# Patient Record
Sex: MALE | Race: WHITE | Employment: OTHER | URBAN - METROPOLITAN AREA
[De-identification: names, ages, dates, MRNs, and addresses within clinical notes are randomized per-mention and may not be internally consistent; named-entity substitution may affect disease eponyms.]

---

## 2019-06-04 ENCOUNTER — TRANSFERRED RECORDS (OUTPATIENT)
Dept: HEALTH INFORMATION MANAGEMENT | Facility: CLINIC | Age: 79
End: 2019-06-04

## 2019-10-16 DIAGNOSIS — G80.0 SPASTIC QUADRIPLEGIC CEREBRAL PALSY (H): ICD-10-CM

## 2019-10-16 DIAGNOSIS — N31.9 NEUROGENIC BLADDER: Primary | ICD-10-CM

## 2019-10-16 RX ORDER — AMPICILLIN 2 G/1
2 INJECTION, POWDER, FOR SOLUTION INTRAVENOUS
Status: DISCONTINUED | OUTPATIENT
Start: 2019-10-16 | End: 2019-10-16 | Stop reason: HOSPADM

## 2019-10-16 NOTE — PROGRESS NOTES
79 yo M with CP, GMFCS 5 with neurogenic bladder managed with an indwelling ceballos catheter. He takes terazosin 2mg at night for BPH. He is verbal and has a manual wheelchair. He transfers with [facility] and [facility]. His ceballos catheter, that he has had for 4 years is changed monthly. He has had some ventral tearing and bleeding with having a prolonged catheter. He has no issues with leakage, urolithiasis or UTIs.    Desires SP tube.

## 2019-10-17 ENCOUNTER — TELEPHONE (OUTPATIENT)
Dept: UROLOGY | Facility: CLINIC | Age: 79
End: 2019-10-17

## 2019-10-17 PROBLEM — N31.9 NEUROGENIC BLADDER: Status: ACTIVE | Noted: 2019-10-17

## 2019-10-17 PROBLEM — G80.0 SPASTIC QUADRIPLEGIC CEREBRAL PALSY (H): Status: ACTIVE | Noted: 2019-10-17

## 2019-10-17 NOTE — TELEPHONE ENCOUNTER
Patient is scheduled for surgery with Dr. Lal      Spoke or left message with: Carola (medical coordinator)    Date of Surgery: 11/21/19    Location: [place] OR    Informed patient they will need an adult  yes    Pre-op with surgeon (if applicable): n/a    H&P: Scheduled with pcp    Additional imaging/appointments: n/a    Surgery packet: mailed 10/18/19     Additional comments: n/a

## 2019-11-04 ENCOUNTER — TRANSFERRED RECORDS (OUTPATIENT)
Dept: HEALTH INFORMATION MANAGEMENT | Facility: CLINIC | Age: 79
End: 2019-11-04

## 2019-11-08 DIAGNOSIS — N39.0 URINARY TRACT INFECTION: Primary | ICD-10-CM

## 2019-11-08 RX ORDER — NITROFURANTOIN 25; 75 MG/1; MG/1
100 CAPSULE ORAL 2 TIMES DAILY
Qty: 10 CAPSULE | Refills: 0 | Status: SHIPPED | OUTPATIENT
Start: 2019-11-08 | End: 2019-11-20

## 2019-11-19 RX ORDER — LEVOTHYROXINE SODIUM 50 UG/1
50 TABLET ORAL DAILY
COMMUNITY

## 2019-11-19 RX ORDER — PAROXETINE 10 MG/1
20 TABLET, FILM COATED ORAL EVERY EVENING
COMMUNITY

## 2019-11-19 RX ORDER — TERAZOSIN 2 MG/1
2 CAPSULE ORAL AT BEDTIME
COMMUNITY
End: 2019-11-20

## 2019-11-19 RX ORDER — NYSTATIN 10B UNIT
POWDER (EA) MISCELLANEOUS 3 TIMES DAILY PRN
COMMUNITY

## 2019-11-19 RX ORDER — TROLAMINE SALICYLATE 10 G/100G
CREAM TOPICAL 4 TIMES DAILY PRN
COMMUNITY

## 2019-11-19 RX ORDER — FLUTICASONE PROPIONATE 50 MCG
2 SPRAY, SUSPENSION (ML) NASAL
COMMUNITY

## 2019-11-19 RX ORDER — BACLOFEN 20 MG/1
20 TABLET ORAL 4 TIMES DAILY
COMMUNITY

## 2019-11-19 RX ORDER — GINSENG 100 MG
CAPSULE ORAL DAILY PRN
COMMUNITY

## 2019-11-20 ENCOUNTER — DOCUMENTATION ONLY (OUTPATIENT)
Dept: OTHER | Facility: CLINIC | Age: 79
End: 2019-11-20

## 2019-11-20 ENCOUNTER — ANESTHESIA EVENT (OUTPATIENT)
Dept: SURGERY | Facility: CLINIC | Age: 79
End: 2019-11-20
Payer: MEDICARE

## 2019-11-20 SDOH — HEALTH STABILITY: MENTAL HEALTH: HOW OFTEN DO YOU HAVE A DRINK CONTAINING ALCOHOL?: NEVER

## 2019-11-20 NOTE — OR NURSING
Contacted the Anesthesia Control Room on the [place] and spoke with Fatmata Vargas CRNA re: the patient's odontoid process fracture noted in H and P. Fatmata said she would look into this and thanked me for the information.

## 2019-11-21 ENCOUNTER — ANESTHESIA (OUTPATIENT)
Dept: SURGERY | Facility: CLINIC | Age: 79
End: 2019-11-21
Payer: MEDICARE

## 2019-11-21 ENCOUNTER — HOSPITAL ENCOUNTER (OUTPATIENT)
Facility: CLINIC | Age: 79
Discharge: NURSING, REHAB OR LONG-TERM CARE | End: 2019-11-21
Attending: UROLOGY | Admitting: UROLOGY
Payer: MEDICARE

## 2019-11-21 VITALS
SYSTOLIC BLOOD PRESSURE: 142 MMHG | WEIGHT: 155 LBS | HEIGHT: 74 IN | OXYGEN SATURATION: 98 % | BODY MASS INDEX: 19.89 KG/M2 | RESPIRATION RATE: 16 BRPM | TEMPERATURE: 97.5 F | DIASTOLIC BLOOD PRESSURE: 61 MMHG | HEART RATE: 80 BPM

## 2019-11-21 DIAGNOSIS — N31.9 NEUROGENIC BLADDER: Primary | ICD-10-CM

## 2019-11-21 DIAGNOSIS — G80.0 SPASTIC QUADRIPLEGIC CEREBRAL PALSY (H): ICD-10-CM

## 2019-11-21 DIAGNOSIS — N31.9 NEUROGENIC BLADDER: ICD-10-CM

## 2019-11-21 LAB — GLUCOSE BLDC GLUCOMTR-MCNC: 68 MG/DL (ref 70–99)

## 2019-11-21 PROCEDURE — 27210794 ZZH OR GENERAL SUPPLY STERILE: Performed by: UROLOGY

## 2019-11-21 PROCEDURE — C2627 CATH, SUPRAPUBIC/CYSTOSCOPIC: HCPCS | Performed by: UROLOGY

## 2019-11-21 PROCEDURE — 82962 GLUCOSE BLOOD TEST: CPT

## 2019-11-21 PROCEDURE — 25000128 H RX IP 250 OP 636: Performed by: NURSE ANESTHETIST, CERTIFIED REGISTERED

## 2019-11-21 PROCEDURE — 87186 SC STD MICRODIL/AGAR DIL: CPT | Performed by: UROLOGY

## 2019-11-21 PROCEDURE — 40000170 ZZH STATISTIC PRE-PROCEDURE ASSESSMENT II: Performed by: UROLOGY

## 2019-11-21 PROCEDURE — 25800025 ZZH RX 258: Performed by: UROLOGY

## 2019-11-21 PROCEDURE — 87086 URINE CULTURE/COLONY COUNT: CPT | Performed by: UROLOGY

## 2019-11-21 PROCEDURE — 87088 URINE BACTERIA CULTURE: CPT | Performed by: UROLOGY

## 2019-11-21 PROCEDURE — 25000125 ZZHC RX 250: Performed by: NURSE ANESTHETIST, CERTIFIED REGISTERED

## 2019-11-21 PROCEDURE — 25800030 ZZH RX IP 258 OP 636: Performed by: NURSE ANESTHETIST, CERTIFIED REGISTERED

## 2019-11-21 PROCEDURE — 36000055 ZZH SURGERY LEVEL 2 W FLUORO 1ST 30 MIN - UMMC: Performed by: UROLOGY

## 2019-11-21 PROCEDURE — 36000053 ZZH SURGERY LEVEL 2 EA 15 ADDTL MIN - UMMC: Performed by: UROLOGY

## 2019-11-21 PROCEDURE — 25800030 ZZH RX IP 258 OP 636: Performed by: UROLOGY

## 2019-11-21 PROCEDURE — 25000128 H RX IP 250 OP 636: Performed by: UROLOGY

## 2019-11-21 PROCEDURE — 71000027 ZZH RECOVERY PHASE 2 EACH 15 MINS: Performed by: UROLOGY

## 2019-11-21 PROCEDURE — 37000009 ZZH ANESTHESIA TECHNICAL FEE, EACH ADDTL 15 MIN: Performed by: UROLOGY

## 2019-11-21 PROCEDURE — 37000008 ZZH ANESTHESIA TECHNICAL FEE, 1ST 30 MIN: Performed by: UROLOGY

## 2019-11-21 RX ORDER — MEPERIDINE HYDROCHLORIDE 25 MG/ML
12.5 INJECTION INTRAMUSCULAR; INTRAVENOUS; SUBCUTANEOUS
Status: DISCONTINUED | OUTPATIENT
Start: 2019-11-21 | End: 2019-11-21 | Stop reason: HOSPADM

## 2019-11-21 RX ORDER — NALOXONE HYDROCHLORIDE 0.4 MG/ML
.1-.4 INJECTION, SOLUTION INTRAMUSCULAR; INTRAVENOUS; SUBCUTANEOUS
Status: DISCONTINUED | OUTPATIENT
Start: 2019-11-21 | End: 2019-11-21 | Stop reason: HOSPADM

## 2019-11-21 RX ORDER — LIDOCAINE HYDROCHLORIDE 20 MG/ML
INJECTION, SOLUTION INFILTRATION; PERINEURAL PRN
Status: DISCONTINUED | OUTPATIENT
Start: 2019-11-21 | End: 2019-11-21

## 2019-11-21 RX ORDER — ONDANSETRON 2 MG/ML
INJECTION INTRAMUSCULAR; INTRAVENOUS PRN
Status: DISCONTINUED | OUTPATIENT
Start: 2019-11-21 | End: 2019-11-21

## 2019-11-21 RX ORDER — ONDANSETRON 2 MG/ML
4 INJECTION INTRAMUSCULAR; INTRAVENOUS EVERY 30 MIN PRN
Status: DISCONTINUED | OUTPATIENT
Start: 2019-11-21 | End: 2019-11-21 | Stop reason: HOSPADM

## 2019-11-21 RX ORDER — OXYCODONE HYDROCHLORIDE 5 MG/1
5 TABLET ORAL EVERY 6 HOURS PRN
Qty: 6 TABLET | Refills: 0 | Status: SHIPPED | OUTPATIENT
Start: 2019-11-21 | End: 2019-11-24

## 2019-11-21 RX ORDER — FENTANYL CITRATE 50 UG/ML
25-50 INJECTION, SOLUTION INTRAMUSCULAR; INTRAVENOUS
Status: DISCONTINUED | OUTPATIENT
Start: 2019-11-21 | End: 2019-11-21 | Stop reason: HOSPADM

## 2019-11-21 RX ORDER — SODIUM CHLORIDE, SODIUM LACTATE, POTASSIUM CHLORIDE, CALCIUM CHLORIDE 600; 310; 30; 20 MG/100ML; MG/100ML; MG/100ML; MG/100ML
INJECTION, SOLUTION INTRAVENOUS CONTINUOUS PRN
Status: DISCONTINUED | OUTPATIENT
Start: 2019-11-21 | End: 2019-11-21

## 2019-11-21 RX ORDER — POLYETHYLENE GLYCOL 3350 17 G/17G
1 POWDER, FOR SOLUTION ORAL DAILY
Qty: 1 BOTTLE | Refills: 0 | Status: SHIPPED | OUTPATIENT
Start: 2019-11-21

## 2019-11-21 RX ORDER — AMPICILLIN 2 G/1
2 INJECTION, POWDER, FOR SOLUTION INTRAVENOUS
Status: COMPLETED | OUTPATIENT
Start: 2019-11-21 | End: 2019-11-21

## 2019-11-21 RX ORDER — ONDANSETRON 4 MG/1
4 TABLET, ORALLY DISINTEGRATING ORAL EVERY 30 MIN PRN
Status: DISCONTINUED | OUTPATIENT
Start: 2019-11-21 | End: 2019-11-21 | Stop reason: HOSPADM

## 2019-11-21 RX ORDER — PROPOFOL 10 MG/ML
INJECTION, EMULSION INTRAVENOUS CONTINUOUS PRN
Status: DISCONTINUED | OUTPATIENT
Start: 2019-11-21 | End: 2019-11-21

## 2019-11-21 RX ORDER — SODIUM CHLORIDE, SODIUM LACTATE, POTASSIUM CHLORIDE, CALCIUM CHLORIDE 600; 310; 30; 20 MG/100ML; MG/100ML; MG/100ML; MG/100ML
INJECTION, SOLUTION INTRAVENOUS CONTINUOUS
Status: DISCONTINUED | OUTPATIENT
Start: 2019-11-21 | End: 2019-11-21 | Stop reason: HOSPADM

## 2019-11-21 RX ADMIN — AMPICILLIN SODIUM 2 G: 2 INJECTION, POWDER, FOR SOLUTION INTRAMUSCULAR; INTRAVENOUS at 12:20

## 2019-11-21 RX ADMIN — GENTAMICIN SULFATE 240 MG: 40 INJECTION, SOLUTION INTRAMUSCULAR; INTRAVENOUS at 12:20

## 2019-11-21 RX ADMIN — ONDANSETRON 4 MG: 2 INJECTION INTRAMUSCULAR; INTRAVENOUS at 12:42

## 2019-11-21 RX ADMIN — SODIUM CHLORIDE, POTASSIUM CHLORIDE, SODIUM LACTATE AND CALCIUM CHLORIDE: 600; 310; 30; 20 INJECTION, SOLUTION INTRAVENOUS at 11:50

## 2019-11-21 RX ADMIN — PROPOFOL 15 MCG/KG/MIN: 10 INJECTION, EMULSION INTRAVENOUS at 12:09

## 2019-11-21 RX ADMIN — LIDOCAINE HYDROCHLORIDE 30 MG: 20 INJECTION, SOLUTION INFILTRATION; PERINEURAL at 12:09

## 2019-11-21 ASSESSMENT — MIFFLIN-ST. JEOR: SCORE: 1492.83

## 2019-11-21 NOTE — ANESTHESIA PREPROCEDURE EVALUATION
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 Pt has had prior anesthetic.     No history of anesthetic complications          ROS/MED HX    ENT/Pulmonary:  - neg pulmonary ROS     Neurologic:     (+)other neuro cerebral palsy, remote odontoid fracture    Cardiovascular:  - neg cardiovascular ROS       METS/Exercise Tolerance: Comment: Wheel chair bound    Hematologic:     (+) History of blood clots (hx LE DVT) pt is not anticoagulated, -      Musculoskeletal:   (+) arthritis (R hip, L shoulder),  -       GI/Hepatic:     (+) GERD Asymptomatic on medication,       Renal/Genitourinary:     (+) BPH, Other Renal/ Genitourinary (neurogenic bladder),       Endo:     (+) thyroid problem hypothyroidism, .      Psychiatric:         Infectious Disease:   (+) MRSA,       Malignancy:         Other:    (+) other significant disability Wheelchair bound and Developmental delay                       PHYSICAL EXAM:   Mental Status/Neuro: Abnormal Mental Status  Mental Status: latency in response but conversant, understands basic communication.   Airway: Facies: Challenging  Mallampati: Not Assessed  Mouth/Opening: Full  TM distance: > 6 cm  Neck ROM: Limited   Respiratory: Auscultation: CTAB     Resp. Rate: Normal     Resp. Effort: Normal      CV: Rhythm: Regular  Rate: Age appropriate  Heart: Normal Sounds  Edema: None   Comments:               "        LABS:  CBC: No results found for: WBC, HGB, HCT, PLT  BMP: No results found for: NA, POTASSIUM, CHLORIDE, CO2, BUN, CR, GLC  COAGS: No results found for: PTT, INR, FIBR  POC:   Lab Results   Component Value Date    BGM 68 (L) 11/21/2019     OTHER: No results found for: PH, LACT, A1C, ARCHANA, PHOS, MAG, ALBUMIN, PROTTOTAL, ALT, AST, GGT, ALKPHOS, BILITOTAL, BILIDIRECT, LIPASE, AMYLASE, KAT, TSH, T4, T3, CRP, SED     Preop Vitals    BP Readings from Last 3 Encounters:   11/21/19 (!) 141/77    Pulse Readings from Last 3 Encounters:   11/21/19 75      Resp Readings from Last 3 Encounters:   11/21/19 16    SpO2 Readings from Last 3 Encounters:   11/21/19 99%      Temp Readings from Last 1 Encounters:   11/21/19 36.7  C (98  F) (Oral)    Ht Readings from Last 1 Encounters:   11/21/19 1.88 m (6' 2\")      Wt Readings from Last 1 Encounters:   11/21/19 70.3 kg (155 lb)    Estimated body mass index is 19.9 kg/m  as calculated from the following:    Height as of this encounter: 1.88 m (6' 2\").    Weight as of this encounter: 70.3 kg (155 lb).     LDA:        Assessment:   ASA SCORE: 3    H&P: History and physical reviewed and following examination; no interval change.   Smoking Status:  Non-Smoker/Unknown   NPO Status: NPO Appropriate     Plan:   Anes. Type:  MAC   Pre-Medication: None   Induction:  IV (Standard)   Airway: Native Airway   Access/Monitoring: PIV   Maintenance: Propofol Sedation     Postop Plan:   Postop Pain: None  Postop Sedation/Airway: Not planned  Disposition: Outpatient     PONV Management:   Adult Risk Factors:, Non-Smoker   Prevention:, Propofol     CONSENT: Direct conversation; Telephone   Plan and risks discussed with: Legal Guardian   Blood Products: Consented (ALL Blood Products)       Comments for Plan/Consent:  Anesthetic risks and benefits discussed with legal guardian Roman Hope over telephone, as well as patient's caregiver Carola. Will plan for native airway with care to avoid C-spine " manipulation. They understand the possibility of conversion to GA with emergency airway manipulation, and we will plan to have fiberoptic bronchoscope on standby to minimize cervical spine manipulation in the event of requiring intubation. All questions were addressed.    Erlin Calderon, DO Erlin Calderon, DO

## 2019-11-21 NOTE — ANESTHESIA CARE TRANSFER NOTE
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    Anesthesia Type:   MAC     Note:  Airway :Room Air  Patient transferred to:Phase II  Comments: To phase 2 with CRNA, RN. Pt alert, appropriate, VSS on RA. Report and care to phase 2 RNHandoff Report: Identifed the Patient, Identified the Reponsible Provider, Reviewed the pertinent medical history, Discussed the surgical course, Reviewed Intra-OP anesthesia mangement and issues during anesthesia, Set expectations for post-procedure period and Allowed opportunity for questions and acknowledgement of understanding      Vitals: (Last set prior to Anesthesia Care Transfer)    CRNA VITALS  11/21/2019 1242 - 11/21/2019 1320      11/21/2019             Resp Rate (set):  10                Electronically Signed By: KAE Lopez CRNA  November 21, 2019  1:20 PM

## 2019-11-21 NOTE — OR NURSING
Pt may shower tomorrow per Dr. Cash.  This information was relayed to pt and caregiver.  They states understanding.

## 2019-11-21 NOTE — OR NURSING
Pt report handed off to Dr Blackwood.  He is aware of Blood glucose 68.  No need for pre procedure labs or EKG per Dr Calderon.

## 2019-11-21 NOTE — ANESTHESIA POSTPROCEDURE EVALUATION
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       Anesthesia Type:  Not documented  MAC    Reportable Event: NO     PAIN: Uncomplicated   Sign Out status: Comfortable, Well controlled pain     PONV: No PONV   Sign Out status:  No Nausea or Vomiting     Neuro/Psych: Uneventful perioperative course   Sign Out Status: Preoperative baseline; Age appropriate mentation     Airway/Resp.: Uneventful perioperative course   Sign Out Status: Non labored breathing, age appropriate RR; Resp. Status within EXPECTED Parameters     CV: Uneventful perioperative course   Sign Out status: Appropriate BP and perfusion indices; Appropriate HR/Rhythm     Disposition:   Sign Out in:  Phase II  Disposition:  Home  Recovery Course: Uneventful  Follow-Up: Not required           Last Anesthesia Record Vitals:  CRNA VITALS  2019 1242 - 2019 1342      2019             Resp Rate (set):  10          Last PACU Vitals:  Vitals Value Taken Time   BP     Temp     Pulse     Resp     SpO2     Temp src     NIBP 133/67 2019  1:07 PM   Pulse 84 2019  1:10 PM   SpO2 96 % 2019  1:10 PM   Resp     Temp     Ht Rate     Temp 2           Electronically Signed By: Erlin Calderon DO, 2019, 2:28 PM

## 2019-11-21 NOTE — OR NURSING
Dr Calderon aware of cervical fracture (C2 fx floating).  Primary caregiver Carola here with pt.  Pt stand by hands on with transfer belt up to bed.  Stands and pivots only with assist and transfer belt.  Wheelchair bound. Sacrum site reddened non blanchable,healing per Carola. Mepilex to sacrum.  Mechanical soft diet. Pt has slurred speech. Can be difficult to understand will repeat when asked to do so. Carola, primary care giver and pt planning on discharge today.

## 2019-11-21 NOTE — OP NOTE
PREOPERATIVE DIAGNOSIS:   1. Cerebral palsy  2. Neurogenic bladder    POSTOPERATIVE DIAGNOSIS:   1. Cerebral palsy  2. Neurogenic bladder    PROCEDURE PERFORMED:   1. Cystoscopy  2. Intraoperative ultrasound with interpretation of images  3. Suprapubic cystostomy with suprapubic tube placement under ultrasound guidance.     SURGEON: Jairo Lal MD  FELLOW: Keisha Strange MD  RESIDENT: Joey Brambila MD     ESTIMATED BLOOD LOSS: Minimal, less than 5 mL.     TUBES AND DRAINS: A 16-Mongolian Ceballos catheter with 10 mL and balloon.     COMPLICATIONS: None.     BRIEF HISTORY OF PRESENT ILLNESS: Mr Riley is a 78 year old male with history of cerebral palsy and neurogenic bladder that is currently managed with a urethral ceballos catheter. He is having urethral erosion and his family and he elected to have an SP tube place. We discussed options, and recommendations for suprapubic tube for bladder drainage. Patient understood risks, benefits and alternatives and agreed to proceed.    DESCRIPTION OF PROCEDURE: After obtaining informed consent, correctly identifying and marking the patient and confirming, preoperative antibiotics were given. He was brought back to the operating room table, placed supine where MAC anesthesia was induced. He was then prepped and draped in the standard sterile fashion.     We began the case by passing a 16 Fr flexible cystoscope through a well lubricated urethra. The urethra was normal and the bladder was slightly erythematous but no stones or tumors seen. Next, using ultrasound guidance we localized the bladder and confirmed that there was no bowel between the bladder and skin. We then made a 1 centimeter incision with #11 blade scalpel approximately 2-3 fingerbreadths above the pubic bone.  We then used a spinal needle to find a good trajectory under ultrasound guidance into the bladder. Once we isolated this trajectory, we then inserted the Jagdish trocar in through the incision until urine  returned. We then removed the inner cannula of the trocar and placed the 16-Azeri Frey catheter through the trocar and inflated the catheter with 10 mL in the balloon. Dressing was placed around the suprapubic catheter and a drainage bag was attached. The catheter was secured to his abdomen. The patient was awakened from anesthesia in stable condition.      As attending surgeon, Jairo SANFORD MD, was present for the critical portions of the procedure and immediately available for the entire procedure.

## 2019-11-23 LAB
BACTERIA SPEC CULT: ABNORMAL
Lab: ABNORMAL
SPECIMEN SOURCE: ABNORMAL

## 2019-11-25 ENCOUNTER — TELEPHONE (OUTPATIENT)
Dept: UROLOGY | Facility: CLINIC | Age: 79
End: 2019-11-25

## 2019-11-25 NOTE — TELEPHONE ENCOUNTER
[facility] Call Center    Phone Message    May a detailed message be left on voicemail: no    Reason for Call: Order(s): Home Care Orders: Other: Molly with the Group Home needs Split Gauze 4X4 or 2X2 immediately.  They are completely out.    Comments:  Call Molly @ [phone]  Pharmacy details:      [facility] is [address] [place], [place] and the contact number is [phone] and fax number is [phone].          Action Taken: Message routed to:  [facility] ([facility] Urology

## 2019-11-25 NOTE — TELEPHONE ENCOUNTER
Called the home back and told them we would be able to right orders and have MD sign the orders.on Wednesday . Kathryn Caballero LPN Staff Nurse

## 2019-12-31 ENCOUNTER — OFFICE VISIT (OUTPATIENT)
Dept: UROLOGY | Facility: CLINIC | Age: 79
End: 2019-12-31
Payer: MEDICARE

## 2019-12-31 VITALS
HEIGHT: 72 IN | HEART RATE: 59 BPM | SYSTOLIC BLOOD PRESSURE: 118 MMHG | DIASTOLIC BLOOD PRESSURE: 78 MMHG | WEIGHT: 160 LBS | BODY MASS INDEX: 21.67 KG/M2

## 2019-12-31 DIAGNOSIS — N31.9 NEUROGENIC BLADDER: ICD-10-CM

## 2019-12-31 DIAGNOSIS — Z43.5 ENCOUNTER FOR CARE OR REPLACEMENT OF SUPRAPUBIC TUBE (H): Primary | ICD-10-CM

## 2019-12-31 PROCEDURE — 87086 URINE CULTURE/COLONY COUNT: CPT | Performed by: PHYSICIAN ASSISTANT

## 2019-12-31 RX ORDER — CIPROFLOXACIN 500 MG/1
500 TABLET, FILM COATED ORAL ONCE
Status: COMPLETED | OUTPATIENT
Start: 2019-12-31 | End: 2019-12-31

## 2019-12-31 RX ADMIN — CIPROFLOXACIN 500 MG: 500 TABLET, FILM COATED ORAL at 14:18

## 2019-12-31 ASSESSMENT — MIFFLIN-ST. JEOR: SCORE: 1478.76

## 2019-12-31 ASSESSMENT — PAIN SCALES - GENERAL: PAINLEVEL: NO PAIN (0)

## 2019-12-31 NOTE — LETTER
12/31/2019       RE: Chang Riley  [facility]  [address]  [facility] [address]     Dear Colleague,    Thank you for referring your patient, Chang Riley, to the [facility] UROLOGY AND INST FOR PROSTATE AND UROLOGIC CANCERS at [facility]. Please see a copy of my visit note below.    Urology post-operative visit:    Procedure: Suprapubic cystostomy tube insertion  Date: 11/21/19  Surgeon: Dr. Jairo Lal    HPI:  Chang Riley is a 79 year old male who is 6 weeks status post suprapubic tube insertion for a history of neurogenic bladder secondary to Cerebral Palsy with resulting urinary retention. He was previously managing his bladder with a urethral Frey catheter that was causing urethral erosion.    He presents today for his first tube exchange. Caretaker from his group home reports that he was starting to get some redness around the tube site but this resolved after he started taking Doxycycline for a cellulitis on his neck. They request a urine culture to ensure no MRSA in the urine as this has been going around the group home. There have been no other issues relating to the tube since it was placed.    PEx  /78   Pulse 59   Ht 1.829 m (6')   Wt 72.6 kg (160 lb)   BMI 21.70 kg/m     GEN: well-appearing male in NAD  RESP: no increased respiratory effort  ABD: soft, NT, ND. SP cystostomy site appears clean, dry, and intact. No tenderness or evidence of cellulitis. No hematoma. Catheter indwelling draining clear yellow urine.      A/P: Chang Riley is a 79 year old year old male who is 6 weeks status post suprapubic tube insertion.  Excellent outcome, tube changed uneventfully in clinic today.    -Group home caretaker requests next SP tube change to be performed in urology clinic. Follow up in 1 month on nurse schedule for SP tube exchange. If this goes well, can continue to have home health RN change SP tube monthly at group home.   -Urine  sent for culture to ensure no MRSA in the urine per group home request. If no MRSA, no treatment needed as patient is otherwise asymptomatic at this time.     Follow up with myself or Dr. Lal in 1 year with a renal ultrasound and Cr beforehand, sooner as needed.    I spent 10 minutes with the patient discussing the above mentioned findings and reviewing the assessment and plan, greater than 50% of which was spent on counseling and coordination of care. All questions were answered to the patients satisfaction.    _________________________________________________________________  Angela Coulter PA-C

## 2019-12-31 NOTE — PROGRESS NOTES
Urology post-operative visit:    Procedure: Suprapubic cystostomy tube insertion  Date: 11/21/19  Surgeon: Dr. Jairo Lal    HPI:  Chang Riley is a 79 year old male who is 6 weeks status post suprapubic tube insertion for a history of neurogenic bladder secondary to Cerebral Palsy with resulting urinary retention. He was previously managing his bladder with a urethral Frey catheter that was causing urethral erosion.    He presents today for his first tube exchange. Caretaker from his group home reports that he was starting to get some redness around the tube site but this resolved after he started taking Doxycycline for a cellulitis on his neck. They request a urine culture to ensure no MRSA in the urine as this has been going around the group home. There have been no other issues relating to the tube since it was placed.    PEx  /78   Pulse 59   Ht 1.829 m (6')   Wt 72.6 kg (160 lb)   BMI 21.70 kg/m    GEN: well-appearing male in NAD  RESP: no increased respiratory effort  ABD: soft, NT, ND. SP cystostomy site appears clean, dry, and intact. No tenderness or evidence of cellulitis. No hematoma. Catheter indwelling draining clear yellow urine.      A/P: Chang Riley is a 79 year old year old male who is 6 weeks status post suprapubic tube insertion.  Excellent outcome, tube changed uneventfully in clinic today.    -Group home caretaker requests next SP tube change to be performed in urology clinic. Follow up in 1 month on nurse schedule for SP tube exchange. If this goes well, can continue to have home health RN change SP tube monthly at group home.   -Urine sent for culture to ensure no MRSA in the urine per group home request. If no MRSA, no treatment needed as patient is otherwise asymptomatic at this time.     Follow up with myself or Dr. Lal in 1 year with a renal ultrasound and Cr beforehand, sooner as needed.    I spent 10 minutes with the patient discussing the above mentioned  findings and reviewing the assessment and plan, greater than 50% of which was spent on counseling and coordination of care. All questions were answered to the patients satisfaction.    _________________________________________________________________  Angela Coulter PA-C  Department of Urology

## 2019-12-31 NOTE — NURSING NOTE
Chief Complaint   Patient presents with     RECHECK     SP change        Patient Active Problem List   Diagnosis     Neurogenic bladder     Spastic quadriplegic cerebral palsy (H)       Allergies   Allergen Reactions     Caffeine Nausea and Vomiting     se     Chocolate Nausea and Vomiting     Lactulose        Current Outpatient Medications   Medication Sig Dispense Refill     bacitracin 500 UNIT/GM OINT Apply topically daily as needed for wound care       baclofen (LIORESAL) 20 MG tablet Take 20 mg by mouth 4 times daily 0800, 1200, 1600 and 2000       carbamide peroxide (DEBROX) 6.5 % otic solution Place 3 drops into both ears daily       Cranberry-Vitamin C-Vitamin E (CRANBERRY PLUS VITAMIN C) 4200-20-3 MG-MG-UNIT CAPS Take 8,400 mg by mouth 3 times daily       fluticasone (FLONASE) 50 MCG/ACT nasal spray Spray 2 sprays into both nostrils nightly as needed for rhinitis or allergies       levothyroxine (SYNTHROID/LEVOTHROID) 50 MCG tablet Take 50 mcg by mouth daily       Multiple Vitamins-Minerals (MULTIVITAMIN ADULTS PO) Take 1 tablet by mouth daily       nystatin POWD 3 times daily as needed Apply 1 strip topically to affected areas three times daily as needed       omeprazole (PRILOSEC) 20 MG DR capsule Take 20 mg by mouth 2 times daily       order for DME Equipment being ordered: split 4x4 gauze sponges (drain sponges) 30 Piece 1     PARoxetine (PAXIL) 10 MG tablet Take 20 mg by mouth every evening       polyethylene glycol (MIRALAX/GLYCOLAX) powder Take 17 g (1 capful) by mouth daily 1 Bottle 0     Psyllium (METAMUCIL SMOOTH TEXTURE PO) Take by mouth daily One teaspoon by mouth daily       trolamine salicylate (ASPERCREME) 10 % external cream Apply topically 4 times daily as needed for moderate pain            Chang Riley presents to clinic for scheduled [Yes] catheter exchange.  Order has been verified: Yes.    Removal:  16 Fr straight tipped silicone ceballos catheter removed from suprapubic meatus without  difficulty.    Insertion:  16 Fr straight tipped silicone ceballos catheter inserted into suprapubic meatus in the usual sterile fashion without difficulty.  Balloon filled with 10 mL sterile H2O.  Received 5 ml alcdies urine output.   Catheter secured in place with leg strap: N/A.     Pt instructed to take Cipro  500 mg as prescribed.    Patient did tolerate procedure well.    Patient instructed as to where to call or go for pain, fever, leakage, or decreased urine flow.     Mabel Aquino CMA,   12/31/2019  2:14 PM

## 2019-12-31 NOTE — PATIENT INSTRUCTIONS
UROLOGY CLINIC VISIT PATIENT INSTRUCTIONS    Follow up for nurse visit in 1 month for suprapubic tube exchange.    If you have any issues, questions or concerns in the meantime, do not hesitate to contact us at [phone] or via [employer].     It was a pleasure meeting with you today.  Thank you for allowing me and my team the privilege of caring for you today.  YOU are the reason we are here, and I truly hope we provided you with the excellent service you deserve.  Please let us know if there is anything else we can do for you so that we can be sure you are leaving completely satisfied with your care experience.

## 2019-12-31 NOTE — NURSING NOTE
The following medication was given:     MEDICATION:  Cipro   ROUTE: PO  SITE: Medication was given orally   DOSE: 500mg  LOT #: [id number]  : [employer]   EXPIRATION DATE: 08/21  NDC#: [id number]   Was there drug waste? Kaylee Aquino CMA  December 31, 2019

## 2019-12-31 NOTE — LETTER
Chang Riley   [facility]  [address] [facility] [address]        Dear Chang     I am writing you concerning your recent test results:    Your urine culture is negative for infection, no treatment needed at this time.    Results for orders placed or performed in visit on 12/31/19   Urine Culture Aerobic Bacterial     Status: None   Result Value Ref Range    Specimen Description Catheterized Urine     Culture Micro No growth      Resulted Orders   Urine Culture Aerobic Bacterial   Result Value Ref Range    Specimen Description Catheterized Urine     Culture Micro No growth        Please let me know if you have any questions.      Sincerely,    Angela Coulter PA-C        [facility] UROLOGY AND INST FOR PROSTATE AND UROLOGIC CANCERS  [address] [facility]  [address] [facility] [address]  Phone: [phone]  Fax: [phone]

## 2020-01-01 LAB
BACTERIA SPEC CULT: NO GROWTH
SPECIMEN SOURCE: NORMAL

## 2020-01-06 ENCOUNTER — TELEPHONE (OUTPATIENT)
Dept: UROLOGY | Facility: CLINIC | Age: 80
End: 2020-01-06

## 2020-01-06 NOTE — TELEPHONE ENCOUNTER
[facility] Call Center    Phone Message    May a detailed message be left on voicemail: yes    Reason for Call: Other: Carola (medical coordinator for Chang) returning call from friday to Angela Coulter PA-C to discuss results of cath (no previous encounter). Angela not in clinic at time of return call. please call Carola back to discuss results. ok to leave detailed voicemail with results.     Action Taken: Message routed to:  [facility] marty uro

## 2020-01-09 NOTE — TELEPHONE ENCOUNTER
Patient called and they received the letter of negative culture Kathryn Caballero LPN Staff Nurse

## 2020-01-20 ENCOUNTER — PRE VISIT (OUTPATIENT)
Dept: UROLOGY | Facility: CLINIC | Age: 80
End: 2020-01-20

## 2020-01-20 NOTE — TELEPHONE ENCOUNTER
Chief Complaint   Patient presents with     Previsit     16 fr SP tube catheter change         Remington Kelly MA

## 2020-01-31 ENCOUNTER — ALLIED HEALTH/NURSE VISIT (OUTPATIENT)
Dept: UROLOGY | Facility: CLINIC | Age: 80
End: 2020-01-31
Payer: MEDICARE

## 2020-01-31 DIAGNOSIS — N31.9 NEUROGENIC BLADDER: Primary | ICD-10-CM

## 2020-01-31 RX ORDER — CIPROFLOXACIN 500 MG/1
500 TABLET, FILM COATED ORAL ONCE
Status: COMPLETED | OUTPATIENT
Start: 2020-01-31 | End: 2020-01-31

## 2020-01-31 RX ADMIN — CIPROFLOXACIN 500 MG: 500 TABLET, FILM COATED ORAL at 14:00

## 2020-01-31 NOTE — PATIENT INSTRUCTIONS
Patient states that the patient will follow up at [facility] for his next SP tube catheter change due to parking cost @ the [facility] and the RN @ the group home is not comfortable changing the SP tube for the patient.      It was a pleasure meeting with you today.  Thank you for allowing me and my team the privilege of caring for you today.  YOU are the reason we are here, and I truly hope we provided you with the excellent service you deserve.  Please let us know if there is anything else we can do for you so that we can be sure you are leaving completely satisfied with your care experience.      Remington Kelly MA

## 2020-01-31 NOTE — PROGRESS NOTES
Chief Complaint   Patient presents with     Allied Health Visit     16 fr Silicone SP tube catheter change        Patient Active Problem List   Diagnosis     Neurogenic bladder     Spastic quadriplegic cerebral palsy (H)       Allergies   Allergen Reactions     Caffeine Nausea and Vomiting     se     Chocolate Nausea and Vomiting     Lactulose        Current Outpatient Medications   Medication Sig Dispense Refill     bacitracin 500 UNIT/GM OINT Apply topically daily as needed for wound care       baclofen (LIORESAL) 20 MG tablet Take 20 mg by mouth 4 times daily 0800, 1200, 1600 and 2000       carbamide peroxide (DEBROX) 6.5 % otic solution Place 3 drops into both ears daily       Cranberry-Vitamin C-Vitamin E (CRANBERRY PLUS VITAMIN C) 4200-20-3 MG-MG-UNIT CAPS Take 8,400 mg by mouth 3 times daily       fluticasone (FLONASE) 50 MCG/ACT nasal spray Spray 2 sprays into both nostrils nightly as needed for rhinitis or allergies       levothyroxine (SYNTHROID/LEVOTHROID) 50 MCG tablet Take 50 mcg by mouth daily       Multiple Vitamins-Minerals (MULTIVITAMIN ADULTS PO) Take 1 tablet by mouth daily       nystatin POWD 3 times daily as needed Apply 1 strip topically to affected areas three times daily as needed       omeprazole (PRILOSEC) 20 MG DR capsule Take 20 mg by mouth 2 times daily       order for DME Equipment being ordered: split 4x4 gauze sponges (drain sponges) 30 Piece 1     PARoxetine (PAXIL) 10 MG tablet Take 20 mg by mouth every evening       polyethylene glycol (MIRALAX/GLYCOLAX) powder Take 17 g (1 capful) by mouth daily 1 Bottle 0     Psyllium (METAMUCIL SMOOTH TEXTURE PO) Take by mouth daily One teaspoon by mouth daily       trolamine salicylate (ASPERCREME) 10 % external cream Apply topically 4 times daily as needed for moderate pain          Social History     Tobacco Use     Smoking status: Never Smoker     Smokeless tobacco: Never Used   Substance Use Topics     Alcohol use: Never     Frequency:  Never     Drug use: Never       Chang Riley comes into clinic today at the request of Angela Coulter for 16 fr silicone SP tube catheter change.    This service provided today was under the direct supervision of Angela Coulter PA-C, who was available if needed.    Chang Riley presents to clinic for scheduled [Yes] catheter exchange.  Order has been verified: Yes.    Removal:  16 Fr straight tipped silicone ceballos catheter removed from suprapubic meatus without difficulty.    Insertion:  16 Fr straight tipped silicone ceballos catheter inserted into suprapubic meatus in the usual sterile fashion without difficulty.  Balloon filled with 7 mL sterile H2O.  Received 30 ml yellow urine output.   Catheter secured in place with leg strap: Yes.     One Cipro 500 mg given per protocol: Yes.   The following medication was given:     MEDICATION:  Cipro  ROUTE: Oral  SITE: mouth  DOSE: 500 mg  LOT #: [id number]  : ipDatatel  EXPIRATION DATE: 08/21  NDC#: [phone] [id number]   Was there drug waste? No    Prior to medication administration, verified patient identity using patient's name and date of birth.  Due to medication administration, patient instructed to remain in clinic for 15 minutes  afterwards, and to report any adverse reaction to me immediately.    Drug Amount Wasted:  None.  Vial/Syringe: Single dose vial    Patient did tolerate procedure well.    Patient instructed as to where to call or go for pain, fever, leakage, or decreased urine flow.       Remington Kelly MA  1/31/2020  1:55 PM

## 2020-10-13 ENCOUNTER — AMBULATORY - HEALTHEAST (OUTPATIENT)
Dept: OTHER | Facility: CLINIC | Age: 80
End: 2020-10-13

## 2020-10-13 ENCOUNTER — DOCUMENTATION ONLY (OUTPATIENT)
Dept: OTHER | Facility: CLINIC | Age: 80
End: 2020-10-13

## 2021-01-04 ENCOUNTER — APPOINTMENT (OUTPATIENT)
Dept: GENERAL RADIOLOGY | Facility: CLINIC | Age: 81
End: 2021-01-04
Attending: EMERGENCY MEDICINE
Payer: COMMERCIAL

## 2021-01-04 ENCOUNTER — HOSPITAL ENCOUNTER (EMERGENCY)
Facility: CLINIC | Age: 81
Discharge: HOME | End: 2021-01-05
Attending: EMERGENCY MEDICINE | Admitting: EMERGENCY MEDICINE
Payer: COMMERCIAL

## 2021-01-04 DIAGNOSIS — T50.901A OVERDOSE, ACCIDENTAL OR UNINTENTIONAL, INITIAL ENCOUNTER: ICD-10-CM

## 2021-01-04 DIAGNOSIS — J18.9 PNEUMONIA DUE TO INFECTIOUS ORGANISM, UNSPECIFIED LATERALITY, UNSPECIFIED PART OF LUNG: ICD-10-CM

## 2021-01-04 LAB
ANION GAP SERPL CALCULATED.3IONS-SCNC: <1 MMOL/L (ref 3–14)
BASOPHILS # BLD AUTO: 0 10E9/L (ref 0–0.2)
BASOPHILS NFR BLD AUTO: 0.3 %
BUN SERPL-MCNC: 16 MG/DL (ref 7–30)
CALCIUM SERPL-MCNC: 9.1 MG/DL (ref 8.5–10.1)
CHLORIDE SERPL-SCNC: 103 MMOL/L (ref 94–109)
CO2 SERPL-SCNC: 32 MMOL/L (ref 20–32)
CREAT SERPL-MCNC: 0.46 MG/DL (ref 0.66–1.25)
DIFFERENTIAL METHOD BLD: ABNORMAL
EOSINOPHIL # BLD AUTO: 0.2 10E9/L (ref 0–0.7)
EOSINOPHIL NFR BLD AUTO: 5.4 %
ERYTHROCYTE [DISTWIDTH] IN BLOOD BY AUTOMATED COUNT: 21.6 % (ref 10–15)
GFR SERPL CREATININE-BSD FRML MDRD: >90 ML/MIN/{1.73_M2}
GLUCOSE SERPL-MCNC: 76 MG/DL (ref 70–99)
HCT VFR BLD AUTO: 33.1 % (ref 40–53)
HGB BLD-MCNC: 10 G/DL (ref 13.3–17.7)
IMM GRANULOCYTES # BLD: 0 10E9/L (ref 0–0.4)
IMM GRANULOCYTES NFR BLD: 0 %
LYMPHOCYTES # BLD AUTO: 1 10E9/L (ref 0.8–5.3)
LYMPHOCYTES NFR BLD AUTO: 26.3 %
MCH RBC QN AUTO: 23.8 PG (ref 26.5–33)
MCHC RBC AUTO-ENTMCNC: 30.2 G/DL (ref 31.5–36.5)
MCV RBC AUTO: 79 FL (ref 78–100)
MONOCYTES # BLD AUTO: 0.3 10E9/L (ref 0–1.3)
MONOCYTES NFR BLD AUTO: 8.7 %
NEUTROPHILS # BLD AUTO: 2.2 10E9/L (ref 1.6–8.3)
NEUTROPHILS NFR BLD AUTO: 59.3 %
NRBC # BLD AUTO: 0 10*3/UL
NRBC BLD AUTO-RTO: 0 /100
PLATELET # BLD AUTO: 182 10E9/L (ref 150–450)
POTASSIUM SERPL-SCNC: 4.2 MMOL/L (ref 3.4–5.3)
RBC # BLD AUTO: 4.2 10E12/L (ref 4.4–5.9)
SODIUM SERPL-SCNC: 135 MMOL/L (ref 133–144)
WBC # BLD AUTO: 3.7 10E9/L (ref 4–11)

## 2021-01-04 PROCEDURE — 80048 BASIC METABOLIC PNL TOTAL CA: CPT | Performed by: EMERGENCY MEDICINE

## 2021-01-04 PROCEDURE — 96365 THER/PROPH/DIAG IV INF INIT: CPT

## 2021-01-04 PROCEDURE — 85025 COMPLETE CBC W/AUTO DIFF WBC: CPT | Performed by: EMERGENCY MEDICINE

## 2021-01-04 PROCEDURE — 71046 X-RAY EXAM CHEST 2 VIEWS: CPT

## 2021-01-04 PROCEDURE — 99284 EMERGENCY DEPT VISIT MOD MDM: CPT | Mod: 25

## 2021-01-04 PROCEDURE — 250N000011 HC RX IP 250 OP 636: Performed by: EMERGENCY MEDICINE

## 2021-01-04 PROCEDURE — 51798 US URINE CAPACITY MEASURE: CPT

## 2021-01-04 RX ORDER — DOXYCYCLINE HYCLATE 100 MG
100 TABLET ORAL 2 TIMES DAILY
Qty: 14 TABLET | Refills: 0 | Status: SHIPPED | OUTPATIENT
Start: 2021-01-04 | End: 2021-01-11

## 2021-01-04 RX ORDER — DOXYCYCLINE 100 MG/10ML
100 INJECTION, POWDER, LYOPHILIZED, FOR SOLUTION INTRAVENOUS ONCE
Status: DISCONTINUED | OUTPATIENT
Start: 2021-01-04 | End: 2021-01-05 | Stop reason: HOSPADM

## 2021-01-04 RX ORDER — CEFDINIR 250 MG/5ML
300 POWDER, FOR SUSPENSION ORAL 2 TIMES DAILY
Qty: 84 ML | Refills: 0 | Status: SHIPPED | OUTPATIENT
Start: 2021-01-04 | End: 2021-01-11

## 2021-01-04 RX ORDER — CEFTRIAXONE 2 G/1
2 INJECTION, POWDER, FOR SOLUTION INTRAMUSCULAR; INTRAVENOUS ONCE
Status: COMPLETED | OUTPATIENT
Start: 2021-01-04 | End: 2021-01-05

## 2021-01-04 RX ADMIN — CEFTRIAXONE SODIUM 2 G: 2 INJECTION, POWDER, FOR SOLUTION INTRAMUSCULAR; INTRAVENOUS at 23:54

## 2021-01-04 NOTE — ED AVS SNAPSHOT
[facility] Dept  [address] [address]  Phone: [phone]  Fax: [phone]                                    Chang Riley   MRN: [id number]    Department: [facility] Emergency Dept   Date of Visit: 1/4/2021           After Visit Summary Signature Page    I have received my discharge instructions, and my questions have been answered. I have discussed any challenges I see with this plan with the nurse or doctor.    ..........................................................................................................................................  Patient/Patient Representative Signature      ..........................................................................................................................................  Patient Representative Print Name and Relationship to Patient    ..................................................               ................................................  Date                                   Time    ..........................................................................................................................................  Reviewed by Signature/Title    ...................................................              ..............................................  Date                                               Time          22EPIC Rev 08/18

## 2021-01-05 VITALS
DIASTOLIC BLOOD PRESSURE: 61 MMHG | RESPIRATION RATE: 9 BRPM | SYSTOLIC BLOOD PRESSURE: 130 MMHG | OXYGEN SATURATION: 99 % | HEART RATE: 44 BPM | TEMPERATURE: 98.3 F

## 2021-01-05 NOTE — ED PROVIDER NOTES
History   Chief Complaint:  Ingestion     The history is provided by the EMS personnel, the nursing home and the patient. History limited by: Underlying Cerebral Palsy and Speech limitations.      Chang Riley is a 80 year old male with history of cerebral palsy who presents via EMS after an accidental ingestion of numerous drugs tonight at 8 PM tonight. The patient received an extra 60 mg of Baclofen than the prescribed 20mg as well as Gabapentin 100 mg, Flexeril 10 mg, and Rosuvastatin 20 mg - all of which he does not typically take. The patient is suppose to be taking 20 mg baclofen 4x daily, but at his fourth dose of the day he was given 80 mg. The patient denies any symptoms currently. Demar, a staff member at his group home, stated there was no change in his mental or physical status. The patient was diagnosed with COVID-19 on 10/07, hospitalized for one week and spent one week in a TCU facility. Since then, he has had no fevers, cough, or diarrhea. He's had more speech difficulty, but this has been over the passed few weeks. He is wheel chair bound, and able to control this himself typically.     Review of Systems   Reason unable to perform ROS: Limited due to cerebral palsy and speech limitations.     Allergies:  Caffeine  Chocolate  Lactulose    Medications:  Baclofen  Levothyroxine  Omeprazole  Paxil    Past Medical History:    Cerebral palsy  Cognitive developmental delay  Neurogenic bladder  Spastic quadriplegic cerebral palsy    Depression  GERD    Past Surgical History:    Cystostomy, insert tube suprapubic     Social History:  The patient was accompanied to the ED by Demar, staff member at his group home.      Physical Exam     Patient Vitals for the past 24 hrs:   BP Temp Temp src Pulse Resp SpO2   01/04/21 2157 -- -- -- (!) 46 9 100 %   01/04/21 2130 (!) 150/62 -- -- (!) 48 12 100 %   01/04/21 2124 (!) 147/80 -- -- -- -- --   01/04/21 2107 -- -- -- (!) 48 11 100 %   01/04/21 2106 -- 98.3  F  (36.8  C) Oral -- -- --   01/04/21 2105 (!) 135/95 -- -- (!) 48 11 --       Physical Exam  VS: Reviewed per above  HENT: Mucous membranes moist  EYES: sclera anicteric  CV: Rate as noted, regular rhythm.   RESP: Effort normal. Breath sounds are normal bilaterally.  GI: no tenderness/rebound/guarding, not distended.  NEURO: Alert, able to move left hand slightly but no movement BLE and RUE.  MSK: Contracture deformities of all extremities  SKIN: Warm and dry    Emergency Department Course     Imaging:  Chest XR, PA and LAT:  Interval development of left lower lobe patchy airspace opacity, may reflect aspiration in the proper clinical context, correlate to exclude superimposed infection. Stable cardiomediastinal silhouette. No pneumothorax. The patient's head obscures visualization of the right upper lobe. Unchanged hiatal hernia.  Report per radiology     Laboratory:  CBC: WBC 3.7 (L), HGB 10 (L), otherwise WNL ()  BMP: Anion Gap <1, Creatinine 0.46 (L), otherwise WNL     Emergency Department Course:    Reviewed:  2110 I reviewed nursing notes, vitals, past medical history and care everywhere    Assessments:  2100 I performed a physical exam of the patient as documented above.   2255 I rechecked the patient.     Consults:   2106 I consulted with poison control.     Interventions:  Medications   cefTRIAXone (ROCEPHIN) 2 g vial to attach to  ml bag for ADULTS or NS 50 ml bag for PEDS (has no administration in time range)   doxycycline (VIBRAMYCIN) 100 mg vial to attach to  mL bag (has no administration in time range)         Disposition:  Care of the patient was transferred to my colleague Dr. Man after being watched until 12 AM.     Impression & Plan     Medical Decision Making:  Patient presents to the ER for evaluation of accidental drug administration at his group home.  On arrival vital signs are notable for regular heart rate in the high 40s to low 50s.  SPO2 is 100% on room air, blood  pressure is stable, patient is afebrile.  Patient was confirmed to have been given 80 mg of baclofen instead of 20 mg this evening as well as 100 mg of gabapentin and 10 mg of Flexeril- medications which he does not normally take.  I spoke with poison center with recommendation to watch the patient for 4 hours post ingestion, which is the peak effect of these drugs, for respiratory depression/excessive sedation.    Later in ER course, patient's group home staff mentioned that they would like to test patient's kidney function due to possible decrease in urine output recently as well as get a chest x-ray to test for possible aspiration pneumonia.  Bedside ultrasound is not suggest any urinary retention.  Frey catheter appears to be draining adequately after flushing.  In further discussion with group home staff, patient has not had a new cough or fever that would suggest obvious pneumonia, but they were worried about choking while eating recently that could lead to aspiration.    Labs show evidence of stable renal function, no leukocytosis.  X-ray shows interval development of a left lower lobe patchy airspace opacity that could reflect aspiration or pneumonia.  No fevers or leukocytosis or hypoxemia to suggest need for admission, but he was given prescription for outpatient antibiotics and his first dose of antibiotics in the ER.  After period of monitoring per poison center recommendations, patient still arouse to voice despite being sleepy and had no evidence of desaturation requiring admission from an overdose perspective.  Group home staff with him felt comfortable with discharge as well. At signout to my colleague, patient was awaiting transfer back to his group home.    Diagnosis:    ICD-10-CM    1. Pneumonia due to infectious organism, unspecified laterality, unspecified part of lung  J18.9    2. Overdose, accidental or unintentional, initial encounter  T50.901A        Discharge Medications:  New  Prescriptions    CEFDINIR (OMNICEF) 250 MG/5ML SUSPENSION    Take 6 mLs (300 mg) by mouth 2 times daily for 7 days    DOXYCYCLINE HYCLATE (VIBRA-TABS) 100 MG TABLET    Take 1 tablet (100 mg) by mouth 2 times daily for 7 days       Scribe Disclosure:  I, Nieves Alvarado, am serving as a scribe at 8:59 PM on 1/4/2021 to document services personally performed by Regis Soto MD based on my observations and the provider's statements to me.          Regis Soto MD  01/04/21 [id number]

## 2021-01-05 NOTE — DISCHARGE INSTRUCTIONS
Return if concerns for worsening mental state, fevers, trouble breathing, new concerns. Follow up with primary doctor.

## 2021-01-05 NOTE — ED NOTES
ER MD spoke with poison control and had a different story that patient had received more incorrect medications other than the 20mg extra of baclofen. Advocate at bedside states that patient is at his normal mentation currently. Patient denies feeling any more sleepy.

## 2021-01-05 NOTE — ED NOTES
Nurse from [facility] called to notify us that patient has had signifcantly reduced urine output today. She also mentioned that patient has had a cough. She is requesting lab work and a chest xray. OREN ANTON notified.

## 2021-01-05 NOTE — ED NOTES
Patient's guardian called by this writer. He was aware that patient was here and the situation. He gave permission to treat patient. ER MD at bedside speaking with patient and advocate.

## 2021-01-05 NOTE — ED TRIAGE NOTES
Pt brought into ED via EMS from [facility]. Patient has cerebral palsy and is wheelchair bound. This writer called patient's group home and they reported Patient gets baclofen 20mg QID. Patient was administered 20mg extra in a medication error at his group home at 8pm tonight. Patient received a total of 100mg of baclofen today. Patient able to answer some questions but hard to understand. Patient's advocate from group [facility] en route to ED.

## 2021-01-05 NOTE — ED NOTES
Bed: ED02  Expected date: 1/4/21  Expected time: 8:55 PM  Means of arrival: Ambulance  Comments:  HEMS 421 80M unintentional overdose

## 2021-01-05 NOTE — ED NOTES
Group home staff member at bedside reporting that patient has had decreased urine output from her supra pubic catheter over last few days. Patient was bladder scanned and it resulted in 0mL. ER MD notified.

## 2021-01-05 NOTE — ED NOTES
Unable to obtain IV access or lab work. 2nd RN at bedside to attempt. Pt's supra pubic catheter flushed without difficulty or resistance.

## 2021-01-05 NOTE — ED NOTES
EMS forgot pt RX and care center called. They will send staff to get them in the morning. Rx x2 left at triage desk.

## 2021-01-05 NOTE — ED NOTES
Patient's catheter bag had crack in it and urine on floor. New catheter bag placed to supra pubic catheter. Clear urine output noted coming from suprapubic site.

## 2021-07-10 ENCOUNTER — DOCUMENTATION ONLY (OUTPATIENT)
Dept: OTHER | Facility: CLINIC | Age: 81
End: 2021-07-10

## (undated) DEVICE — NDL PERC ACCESS 18GX20CM M0067001220

## (undated) DEVICE — PACK CYSTO UMMC CUSTOM

## (undated) DEVICE — BAG URINARY DRAIN LUBRISIL IC 4000ML LF 253509A

## (undated) DEVICE — BLADE CLIPPER SGL USE 9680

## (undated) DEVICE — NDL 25GA 2"  8881200441

## (undated) DEVICE — RAD KNIFE HANDLE W/11 BLADE DISPOSABLE 371611

## (undated) DEVICE — NDL COUNTER 20CT 31142493

## (undated) DEVICE — CATH INTRODUCER SUPRAPUBIC 16FR SF-S16-851

## (undated) DEVICE — PITCHER STERILE 1000ML  SSK9004A

## (undated) DEVICE — DRAPE U SPLIT 74X120" 29440

## (undated) DEVICE — GLOVE PROTEXIS W/NEU-THERA 7.5  2D73TE75

## (undated) DEVICE — COVER NEOPROBE SOFTFLEX 5X96" W/BANDS 20-PC596

## (undated) DEVICE — SYR 10ML FINGER CONTROL W/O NDL 309695

## (undated) DEVICE — ENDO SEAL BX PORT BPS-A

## (undated) DEVICE — SYR 70ML TOOMEY 041170

## (undated) DEVICE — LINEN GOWN XLG 5407

## (undated) DEVICE — SPONGE RAY-TEC 4X8" 7318

## (undated) DEVICE — DRSG DRAIN 4X4" 7086

## (undated) DEVICE — LINEN TOWEL PACK X5 5464

## (undated) DEVICE — PACK GOWN 3/PK DISP XL SBA32GPFCB

## (undated) DEVICE — PAD CHUX UNDERPAD 23X24" 7136

## (undated) DEVICE — CATH FOLYSIL 16FR 15ML AA6116

## (undated) RX ORDER — LIDOCAINE HYDROCHLORIDE 20 MG/ML
INJECTION, SOLUTION EPIDURAL; INFILTRATION; INTRACAUDAL; PERINEURAL
Status: DISPENSED
Start: 2019-11-21

## (undated) RX ORDER — CIPROFLOXACIN 500 MG/1
TABLET, FILM COATED ORAL
Status: DISPENSED
Start: 2019-12-31

## (undated) RX ORDER — CIPROFLOXACIN 500 MG/1
TABLET, FILM COATED ORAL
Status: DISPENSED
Start: 2020-01-31

## (undated) RX ORDER — AMPICILLIN 2 G/1
INJECTION, POWDER, FOR SOLUTION INTRAVENOUS
Status: DISPENSED
Start: 2019-11-21

## (undated) RX ORDER — PROPOFOL 10 MG/ML
INJECTION, EMULSION INTRAVENOUS
Status: DISPENSED
Start: 2019-11-21

## (undated) RX ORDER — ONDANSETRON 2 MG/ML
INJECTION INTRAMUSCULAR; INTRAVENOUS
Status: DISPENSED
Start: 2019-11-21

## (undated) RX ORDER — BUPIVACAINE HYDROCHLORIDE AND EPINEPHRINE 2.5; 5 MG/ML; UG/ML
INJECTION, SOLUTION EPIDURAL; INFILTRATION; INTRACAUDAL; PERINEURAL
Status: DISPENSED
Start: 2019-11-21